# Patient Record
Sex: MALE | Race: WHITE
[De-identification: names, ages, dates, MRNs, and addresses within clinical notes are randomized per-mention and may not be internally consistent; named-entity substitution may affect disease eponyms.]

---

## 2020-04-08 ENCOUNTER — HOSPITAL ENCOUNTER (INPATIENT)
Dept: HOSPITAL 1 - ED | Age: 50
LOS: 1 days | Discharge: HOME | DRG: 206 | End: 2020-04-09
Attending: FAMILY MEDICINE | Admitting: FAMILY MEDICINE
Payer: COMMERCIAL

## 2020-04-08 VITALS — DIASTOLIC BLOOD PRESSURE: 82 MMHG | SYSTOLIC BLOOD PRESSURE: 139 MMHG

## 2020-04-08 VITALS
HEIGHT: 71 IN | HEIGHT: 71 IN | WEIGHT: 260 LBS | BODY MASS INDEX: 36.4 KG/M2 | BODY MASS INDEX: 36.4 KG/M2 | WEIGHT: 260 LBS

## 2020-04-08 VITALS — SYSTOLIC BLOOD PRESSURE: 113 MMHG | DIASTOLIC BLOOD PRESSURE: 57 MMHG

## 2020-04-08 VITALS — DIASTOLIC BLOOD PRESSURE: 63 MMHG | SYSTOLIC BLOOD PRESSURE: 123 MMHG

## 2020-04-08 DIAGNOSIS — M94.0: Primary | ICD-10-CM

## 2020-04-08 DIAGNOSIS — K21.9: ICD-10-CM

## 2020-04-08 DIAGNOSIS — Z23: ICD-10-CM

## 2020-04-08 DIAGNOSIS — Z79.51: ICD-10-CM

## 2020-04-08 DIAGNOSIS — J45.909: ICD-10-CM

## 2020-04-08 LAB
ALBUMIN SERPL-MCNC: 3.4 G/DL (ref 3.4–5)
ALP SERPL-CCNC: 44 U/L (ref 46–116)
ALT SERPL-CCNC: 31 U/L (ref 16–63)
AMPHETAMINES UR QL SCN: (no result)
AST SERPL-CCNC: 16 U/L (ref 15–37)
BASOPHILS NFR BLD: 0.5 % (ref 0–2)
BILIRUB SERPL-MCNC: 0.5 MG/DL (ref 0.2–1)
BUN SERPL-MCNC: 18 MG/DL (ref 7–18)
CALCIUM SERPL-MCNC: 8.5 MG/DL (ref 8.5–10.1)
CHLORIDE SERPL-SCNC: 104 MMOL/L (ref 98–107)
CHOLEST SERPL-MCNC: 156 MG/DL (ref ?–200)
CHOLEST/HDLC SERPL: 3.8 MG/DL
CO2 SERPL-SCNC: 27.1 MMOL/L (ref 21–32)
CREAT SERPL-MCNC: 1 MG/DL (ref 0.7–1.3)
ERYTHROCYTE [DISTWIDTH] IN BLOOD BY AUTOMATED COUNT: 13.2 % (ref 11.5–14.5)
GFR SERPLBLD BASED ON 1.73 SQ M-ARVRAT: > 60 ML/MIN
GLUCOSE SERPL-MCNC: 107 MG/DL (ref 74–106)
HDLC SERPL-MCNC: 41 MG/DL (ref 40–60)
LIPASE SERPL-CCNC: 89 IU/L (ref 73–393)
MICROSCOPIC UR-IMP: NO
PLATELET # BLD: 194 X10^3MCL (ref 130–400)
POTASSIUM SERPL-SCNC: 4.3 MMOL/L (ref 3.5–5.1)
PROT SERPL-MCNC: 7 G/DL (ref 6.4–8.2)
RBC # UR STRIP.AUTO: NEGATIVE /UL
SODIUM SERPL-SCNC: 139 MMOL/L (ref 136–145)
TRIGL SERPL-MCNC: 54 MG/DL (ref ?–150)
UA SPECIFIC GRAVITY: 1.02 (ref 1–1.03)

## 2020-04-08 PROCEDURE — G0378 HOSPITAL OBSERVATION PER HR: HCPCS

## 2020-04-09 VITALS — SYSTOLIC BLOOD PRESSURE: 118 MMHG | DIASTOLIC BLOOD PRESSURE: 67 MMHG

## 2020-04-09 VITALS — SYSTOLIC BLOOD PRESSURE: 112 MMHG | DIASTOLIC BLOOD PRESSURE: 68 MMHG

## 2020-04-09 VITALS — SYSTOLIC BLOOD PRESSURE: 102 MMHG | DIASTOLIC BLOOD PRESSURE: 60 MMHG

## 2020-04-09 LAB
BASOPHILS NFR BLD: 0.3 % (ref 0–2)
BUN SERPL-MCNC: 14 MG/DL (ref 7–18)
CALCIUM SERPL-MCNC: 8.8 MG/DL (ref 8.5–10.1)
CHLORIDE SERPL-SCNC: 104 MMOL/L (ref 98–107)
CO2 SERPL-SCNC: 30.4 MMOL/L (ref 21–32)
CREAT SERPL-MCNC: 0.9 MG/DL (ref 0.7–1.3)
ERYTHROCYTE [DISTWIDTH] IN BLOOD BY AUTOMATED COUNT: 13.1 % (ref 11.5–14.5)
GFR SERPLBLD BASED ON 1.73 SQ M-ARVRAT: > 60 ML/MIN
GLUCOSE SERPL-MCNC: 106 MG/DL (ref 74–106)
MAGNESIUM SERPL-MCNC: 2.2 MG/DL (ref 1.8–2.4)
PHOSPHATE SERPL-MCNC: 3.4 MG/DL (ref 2.5–4.9)
PLATELET # BLD: 191 X10^3MCL (ref 130–400)
POTASSIUM SERPL-SCNC: 4.3 MMOL/L (ref 3.5–5.1)
SODIUM SERPL-SCNC: 141 MMOL/L (ref 136–145)

## 2024-01-01 NOTE — NUR
ASSUMED CARE FROM NIGHT SHIFT. PT IS ALERT. ULTRASOUND IN PROGRESS. LABS
DRAWN.
AWAKE AND TALKING. VITAL SIGNS STABLE. REMARKS "WHEN i'M HOLDING STILL, I HAVE
NO PAIN, WHEN I MOVE, IT'S PAINFUL"
CARE ASSUMED FROM OUTGOING RN. PT RESTING COMFORTABLY IN BED WITH EYES CLOSED.
NO ACUTE DISTRESS NOTED. EVEN AND UNLABORED RESPIRATIONS ON RA. ON TELE# 1
READING SR 63. IVL INTACT. NO S/S PAIN OR SOB AT THIS TIME. BED IN LOWEST
POSITION. SIDE RAILS UPX2. CALL LIGHT WITHIN REACH. WILL CONTINUE TO MONITOR.
CHEST PAIN AFTER TORADOL WAS GIVEN PER PATIENT IS NOW 0/10 ON THE PAIN SCALE.
ER DOCTOR AT BEDSIDE TO SEE PT AND EXPLAINS DIAGNOSTIC RESULTS
LIDOCAINE PATCH APPLIED TO MID-LEFT CHEST PER DR. ALLEN ORDERS.
INSTRUCTED PT TO INFORM RN IF IRRITATION OCCURS. PT TOLERATED WELL. WILL
ENDORSE TO ONCOMING SHIFT.
MD LINO AT BEDSIDE.
PATIENT IS SITTING UP IN BED, C/O 5/10 MID CHEST PAIN ESPECIALLY WHEN DEEP
BREATHING AND IN HIS UPPER BACK AREA. MEDICATED WITH TORADOL IV BY GRETA CARRASCO.
WILL MONITOR FOR RELIEF.
PATIENT READY FOR D/C HOME. HL AND TELE DC/D. DISCHARGE INSTRUCTIONS GIVEN.
PERSONAL BELONGINGS LIST SIGNED. CONDITION APPEARS STABLE.
PATIENT'S PLAN OF CARE WAS DISCUSSED AND REVIEWED WITH LVN:JEM BUI.
I HAVE REVIEWED THE DATA COLLECTION BY LVN (NAME):JEM BUI.
ENTERED ON (DATE/TIME):4/9/20.
 
I CONCUR WITH THE DATA AND ANY EXCEPTIONS OR COMMENTS ARE LISTED BELOW:
PT INSTRUCTED TO PROVIDE URINE SAMPLE ASAP.
PT LYING IN BED A/A BREATHING EQUAL/UNLABORED ON RA. NO ACUTE PAIN/DISTRESS.
IV SITE WNL. BED IN LOW POSITION, CALL LIGHT IN REACH, SAFETY PRECAUTIONS IN
PLACE. WILL ENDORSE TO NIGHT NURSE
PT RESTED COMFORTABLY IN INTERVALS THROUGHOUT THE SHIFT. ALL NEEDS TENDED TO
AND MET. REFUSED COLACE. EVEN AND UNLABORED RESPIRATIONS ON RA. C/O 1/10
TOLERABLE, NON-RADIATING CHEST PAIN. ON TELE# 1 READING SB/SR. BED IN LOWEST
POSITION. SIDE RAILS UPX2. CALL LIGHT WITHIN REACH. WILL ENDORSE TO ONCOMING
SHIFT.
PT RESTING COMFORTABLY IN BED. NO ACUTE DISTRESS NOTED. EVEN AND UNLABORED
RESPIRATIONS ON RA. ON TELE# 1 READING SB 58. NO C/O CHEST PAIN AT THIS TIME.
BED IN LOWEST POSITION SIDE RAILS UPX2. CALL LIGHT WITHIN REACH. WILL CONTINUE
TO MONITOR.
RECEIVED PATIENT SITTING UP IN BED. AWAKE ALERT AND ORIENTED. TELE 1 NSR. PER
PATIENT WHEN HE IS STILL IN BED HE HAS VERY MINIMAL MID UPPER CHEST
DISCOMFORT, THAT WORSENS WHEN HE TAKES A DEEP BREATHOR MOVES AROUND. HL
PATENT. AMBULATES AD RAJAT. RESP EVEN AND UNLABORED, LUNGS CLEAR ON ROOM AIR. NO
ACUTE DISTRESS NOTED.
REPORT GIVEN TO DANILO SHAH TO ASSUME CARE.
TECH AT BEDSIDE FOR EKG.
48.3